# Patient Record
Sex: MALE | Race: OTHER | NOT HISPANIC OR LATINO | URBAN - METROPOLITAN AREA
[De-identification: names, ages, dates, MRNs, and addresses within clinical notes are randomized per-mention and may not be internally consistent; named-entity substitution may affect disease eponyms.]

---

## 2022-01-20 ENCOUNTER — TELEPHONE ENCOUNTER (OUTPATIENT)
Dept: URBAN - METROPOLITAN AREA CLINIC 92 | Facility: CLINIC | Age: 22
End: 2022-01-20

## 2022-01-20 ENCOUNTER — LAB OUTSIDE AN ENCOUNTER (OUTPATIENT)
Dept: URBAN - METROPOLITAN AREA CLINIC 92 | Facility: CLINIC | Age: 22
End: 2022-01-20

## 2022-01-21 ENCOUNTER — LAB OUTSIDE AN ENCOUNTER (OUTPATIENT)
Dept: URBAN - METROPOLITAN AREA CLINIC 86 | Facility: CLINIC | Age: 22
End: 2022-01-21

## 2022-01-21 ENCOUNTER — OFFICE VISIT (OUTPATIENT)
Dept: URBAN - METROPOLITAN AREA CLINIC 86 | Facility: CLINIC | Age: 22
End: 2022-01-21
Payer: COMMERCIAL

## 2022-01-21 DIAGNOSIS — R63.4 WEIGHT LOSS: ICD-10-CM

## 2022-01-21 DIAGNOSIS — R11.0 NAUSEA: ICD-10-CM

## 2022-01-21 DIAGNOSIS — R17 JAUNDICE: ICD-10-CM

## 2022-01-21 DIAGNOSIS — R74.8 ABNORMAL LIVER ENZYMES: ICD-10-CM

## 2022-01-21 PROBLEM — 126660000 DEVIATED NASAL SEPTUM: Status: ACTIVE | Noted: 2022-01-21

## 2022-01-21 PROCEDURE — 99245 OFF/OP CONSLTJ NEW/EST HI 55: CPT

## 2022-01-21 PROCEDURE — 99205 OFFICE O/P NEW HI 60 MIN: CPT

## 2022-01-21 RX ORDER — HYDROXYZINE HYDROCHLORIDE 10 MG/1
1 TABLET AS NEEDED FOR PRURITIS TABLET, FILM COATED ORAL
Qty: 42 | Refills: 0 | OUTPATIENT

## 2022-01-21 RX ORDER — ONDANSETRON HYDROCHLORIDE 4 MG/1
1 TABLET OF THE 4MG ODT TABLET, FILM COATED ORAL
Qty: 20 | Refills: 0 | OUTPATIENT

## 2022-01-21 RX ORDER — METHYLPHENIDATE HYDROCHLORIDE 20 MG/1
1 TABLET ON AN EMPTY STOMACH TABLET ORAL
Status: ACTIVE | COMMUNITY

## 2022-01-21 NOTE — EXAM-PHYSICAL EXAM
Gen: awake and responsive. Eyes: min icteric, normal lids. Mouth: covered with mask. Nose: covered with mask. Hearing: intact grossly. Neck: trachea midline and no jvd. CV: RRR no s3. Lungs: clear. No wheezes, Abd: Soft, nabs, NR NT. No clear hsm. Ext: no sig edema, no palm erythema. Neuro: moves all 4 ext grossly. No asterixis. Skin: no pruritis and no palm erythema.

## 2022-01-21 NOTE — HPI-TODAY'S VISIT:
22 yo Male and he is senior at [employer] and he is here for acute hepatitis.  A copy of the note will be sent to referring provider Dr Renate Torrez MD.  Pt says that for the last month he traveled to [place] and to [facility] and to [place] for ski trip.  He went there on winter break.  last trip from [place] he had a headache.  The next day he had mild fever and was just over 100F. Moth says at one point hit 103F.  Then she did a virtual visit with team.  He says that next 2 days post the 103F went down and subsided and replaced and with nausea and itching.  Pt with some discomfort and fullness and appetite. Dark urine.  Hep a and B and covid 19 vaccinated.  Mom was told could be covid 19 and then told was neg. Friday of last week did labs and mono spot neg and then had to redo the labs.   Jan 19 2022 did labs and saw other doctor and felt was covid 19 and ordered the labs.  Has lost 5 pounds with the last 10 days.  He has missed school.  Jan 13 covid 19 neg,  Jan 19 2022 na 134 slightly low and k 4.3 and cl 96 little low and co2 28 and bun 14 and cr 1.01 glu 70 tp 7.0 and alb 4.4 and tb 3.1 and ca 9.4 and ast 326 and alt 786 and alk 406. wbc 15.2 hg 15.9 and plat 253.  wbc 15.2 hg 15.9 and hct 49.3 plat 253.  neutrophils 1.82 and lymphs 10.18 and elevated and reactive lymphs 0.61. Monocyte 1.98 and basophils 0.46. RPR neg and mono screen neg and HIv neg.   Jan 18 2022 wbc 14.1 hg 15.2 and plat 227 and mcv 87.7 and mono spot neg.  They ordered other std screens.   Plan: 1. Needs to maintain hydration and nutrition. 2. Supplements like boost and ensure help to bridge as lost 5 pounds. If worse may need to be admitted. 3. Need to do more labs to for trends and do the full screens. 4. He has the u.s pending for today. 5. Pt will be doing labs also then next week. 6. Fractionate the bili. 7. See next week.  Stressed to pt the need for social distancing and strict handwashing and wearing a mask and to follow any other new or added CDC recommendations as this is an evolving target.  Duration of the visit was 77 minutes with 10 minutes of chart prep and review of [facility] records and pt info and then 67 minutes for this face to face visit today with the pt and his mother for the visit today reviewing recent records and his course and current status and future plans with the pt and his family.

## 2022-01-22 ENCOUNTER — TELEPHONE ENCOUNTER (OUTPATIENT)
Dept: URBAN - METROPOLITAN AREA CLINIC 92 | Facility: CLINIC | Age: 22
End: 2022-01-22

## 2022-01-22 NOTE — HPI-TODAY'S VISIT:
Dear Chito Max, I was waiting for the other labs to come through but they have not come through yet.  I suspect that they will come through on Monday. The direct bilirubin was 1.33 which was elevated that we do not have the indirect. White blood cell count was elevated at 20.9 and hemoglobin was 16 MCV 84.8 platelet count 294.  Neutrophils were normal at 2.53 and lymphocytes again up at 16.18.  Monocytes up at 2.03. INR normal at 0.96 which is a good sign that the liver is still functioning well. I await the other tests and the speciality tests. U.s not back yet. Spoke with pt re the limited results that were back. Says nausea better but fatigue there. Says itching is about the same. Dr. Herrera

## 2022-01-24 ENCOUNTER — LAB OUTSIDE AN ENCOUNTER (OUTPATIENT)
Dept: URBAN - METROPOLITAN AREA CLINIC 86 | Facility: CLINIC | Age: 22
End: 2022-01-24

## 2022-01-24 ENCOUNTER — TELEPHONE ENCOUNTER (OUTPATIENT)
Dept: URBAN - METROPOLITAN AREA CLINIC 92 | Facility: CLINIC | Age: 22
End: 2022-01-24

## 2022-01-24 NOTE — HPI-TODAY'S VISIT:
Dear Chito Max, More labs back today from Friday. MARCEL antigen which is an immune screen was negative. Hep A IgM is negative but you are immune to hepatitis a as demonstrated by the IgG antibody. Your hepatitis B antibody is 9.1 and I suspect that you need to get a booster at some point as they consider anything less than 11.5 is being not immune.  We could potentially repeat it after this illness and see if it goes up as another alternate but suspect if it did not go up with the immune reaction it may not go up unless you get a HBV booster dose exposure.  We can then check for an amnestic response. Hep B core total negative and hep B core IgM negative. CMV PCR negative.  Herpes PCR negative. Awaiting the [facility] panel which is very important panel for you.  Other tests are still pending as well. Spoke with pt re the labs and he is aware of that. Dr Herrera

## 2022-01-25 ENCOUNTER — OFFICE VISIT (OUTPATIENT)
Dept: URBAN - METROPOLITAN AREA CLINIC 86 | Facility: CLINIC | Age: 22
End: 2022-01-25
Payer: COMMERCIAL

## 2022-01-25 DIAGNOSIS — U07.1 COVID-19: ICD-10-CM

## 2022-01-25 DIAGNOSIS — R11.0 NAUSEA: ICD-10-CM

## 2022-01-25 DIAGNOSIS — R50.9 FEVER: ICD-10-CM

## 2022-01-25 DIAGNOSIS — F90.9 ADHD: ICD-10-CM

## 2022-01-25 DIAGNOSIS — R74.8 ABNORMAL LIVER ENZYMES: ICD-10-CM

## 2022-01-25 DIAGNOSIS — R53.83 FATIGUE: ICD-10-CM

## 2022-01-25 DIAGNOSIS — R17 JAUNDICE: ICD-10-CM

## 2022-01-25 DIAGNOSIS — Z71.89 VACCINE COUNSELING: ICD-10-CM

## 2022-01-25 DIAGNOSIS — B17.8: ICD-10-CM

## 2022-01-25 DIAGNOSIS — R63.4 WEIGHT LOSS: ICD-10-CM

## 2022-01-25 DIAGNOSIS — Z87.09 HISTORY OF SINUSITIS: ICD-10-CM

## 2022-01-25 PROCEDURE — 99214 OFFICE O/P EST MOD 30 MIN: CPT

## 2022-01-25 RX ORDER — HYDROXYZINE HYDROCHLORIDE 10 MG/1
1 TABLET AS NEEDED FOR PRURITIS TABLET, FILM COATED ORAL
Qty: 42 | Refills: 0 | Status: ACTIVE | COMMUNITY

## 2022-01-25 RX ORDER — METHYLPHENIDATE HYDROCHLORIDE 20 MG/1
1 TABLET ON AN EMPTY STOMACH TABLET ORAL
Status: ACTIVE | COMMUNITY

## 2022-01-25 RX ORDER — ONDANSETRON HYDROCHLORIDE 4 MG/1
1 TABLET OF THE 4MG ODT TABLET, FILM COATED ORAL
Qty: 20 | Refills: 0 | OUTPATIENT

## 2022-01-25 RX ORDER — ONDANSETRON HYDROCHLORIDE 4 MG/1
1 TABLET OF THE 4MG ODT TABLET, FILM COATED ORAL
Qty: 20 | Refills: 0 | Status: ACTIVE | COMMUNITY

## 2022-01-25 NOTE — HPI-TODAY'S VISIT:
Pt is a  22 yo Male and here reena follow up post recent visit and he is here for acute hepatitis.  A copy of the note will be sent to referring provider Dr Renate Torrez MD.  Clinically he is feeling better.  Labs slowly tricklin/24: na 138 and k 4.3 and cl 98 and co2 30 and  glu 94 and cr 0.95 and bun 15 and tp 7.1 and alb 4.1 and tb 1.5 and ca 9.0 ast 169 and alt 581 and alk 439 and wbc 15 and hg 15.5 and plat 288.  inr 0.92    factin neg 13 and ama 9.7 neg.   : The direct bilirubin was 1.33 which was elevated that we do not have the indirect. White blood cell count was elevated at 20.9 and hemoglobin was 16 MCV 84.8 platelet count 294.  Neutrophils were normal at 2.53 and lymphocytes again up at 16.18.  Monocytes up at 2.03. INR normal at 0.96 which is a good sign that the liver is still functioning well.  U.s pending to get the report.  CT; done with contrast and he did that : unable to get this now. Slight liver and spleen enlargement and a few LN seen and nothing acute.  Prior tb 3 range so looks he is trending down.  Pt says that for the last month he traveled to [place] and to [facility] and to [place] for ski trip.  He went there on winter break.  last trip from [place] he had a headache.  The next day he had mild fever and was just over 100F. Moth says at one point hit 103F.  Then she did a virtual visit with team.  He says that next 2 days post the 103F went down and subsided and replaced and with nausea and itching.  Pt with some discomfort and fullness and appetite. Dark urine.  Hep a and B and covid 19 vaccinated.  Mom was told could be covid 19 and then told was neg. Friday of last week did labs and mono spot neg and then had to redo the labs.   2022 did labs and saw other doctor and felt was covid 19 and ordered the labs.  Has lost 5 pounds with the last 10 days.   covid 19 neg,  2022 na 134 slightly low and k 4.3 and cl 96 little low and co2 28 and bun 14 and cr 1.01 glu 70 tp 7.0 and alb 4.4 and tb 3.1 and ca 9.4 and ast 326 and alt 786 and alk 406. wbc 15.2 hg 15.9 and plat 253.  wbc 15.2 hg 15.9 and hct 49.3 plat 253.  neutrophils 1.82 and lymphs 10.18 and elevated and reactive lymphs 0.61. Monocyte 1.98 and basophils 0.46. RPR neg and mono screen neg and HIV neg.   2022 wbc 14.1 hg 15.2 and plat 227 and mcv 87.7 and mono spot neg.  They ordered other std screens.   Plan: 1. Needs to maintain hydration and nutrition as doing kavita post the ct. 2. Get the ct when the portal is up. 3. We will do the labs in 1 and 2 weeks and see then. Telemed vs in person. 4. Pt is headed back to school in few days. 5. Little residual mild sore throat and that is classic for mono and little itching. 6. Rtc in 2 weeks.  Stressed to pt the need for social distancing and strict handwashing and wearing a mask and to follow any other new or added CDC recommendations as this is an evolving target.  Duration of the visit was 30  minutes with 5 minutes of chart prep and 30 min of this face to face vsiit for todays visit with the pt and his mother and I discussing the labs and trends at length.

## 2022-01-25 NOTE — EXAM-PHYSICAL EXAM
Gen: awake and responsive. Some fatigue. Eyes: not visibily icteric, normal lids. Mouth: covered with mask. Nose: covered with mask. Hearing: intact grossly. Neck: trachea midline and no jvd. CV: RRR no s3. Lungs: clear. No wheezes, Abd: Soft, nabs, NR NT. No clear hepatomegaly and spleen trace palpable. Ext: no sig edema, no palm erythema. Neuro: moves all 4 ext grossly. No asterixis. Skin: less pruritis and no palm erythema.

## 2022-01-26 ENCOUNTER — TELEPHONE ENCOUNTER (OUTPATIENT)
Dept: URBAN - METROPOLITAN AREA CLINIC 92 | Facility: CLINIC | Age: 22
End: 2022-01-26

## 2022-01-26 NOTE — HPI-TODAY'S VISIT:
Dear Chito Max, Finally today I received the January 21 ultrasound but it turns out it was actually done at [facility] and not through an [facility] location.  I knew that they were trying to get you done at several locations and thought you were done at the [facility] location but instead it was an [facility] location.  Liver appeared normal to them. Pancreas was normal were seen. Gallbladder showed no stones or inflammation. No bile duct dilation seen. No hydronephrosis seen. Spleen also was borderline enlarged here at 13 cm. Liver vessel flow was patent as expected. I tried to call but could not get and vmail for you was not active. Dr Herrera

## 2022-01-27 ENCOUNTER — TELEPHONE ENCOUNTER (OUTPATIENT)
Dept: URBAN - METROPOLITAN AREA CLINIC 92 | Facility: CLINIC | Age: 22
End: 2022-01-27

## 2022-01-27 NOTE — HPI-TODAY'S VISIT:
Dear Chito Max, Jan 21 more labs: alpha one normal 200 level and M1M1 normal phenotype. This was another screen that we did. Good to see is negative. Dr Herrera

## 2022-01-28 ENCOUNTER — LAB OUTSIDE AN ENCOUNTER (OUTPATIENT)
Dept: URBAN - METROPOLITAN AREA CLINIC 86 | Facility: CLINIC | Age: 22
End: 2022-01-28

## 2022-01-28 ENCOUNTER — TELEPHONE ENCOUNTER (OUTPATIENT)
Dept: URBAN - METROPOLITAN AREA CLINIC 86 | Facility: CLINIC | Age: 22
End: 2022-01-28

## 2022-01-29 ENCOUNTER — TELEPHONE ENCOUNTER (OUTPATIENT)
Dept: URBAN - METROPOLITAN AREA CLINIC 92 | Facility: CLINIC | Age: 22
End: 2022-01-29

## 2022-01-29 LAB
ALBUMIN: 4.4
ALKALINE PHOSPHATASE: 344
ALT (SGPT): 271
AST (SGOT): 71
BILIRUBIN, DIRECT: 0.38
BILIRUBIN, TOTAL: 0.8
PROTEIN, TOTAL: 7.2

## 2022-01-29 NOTE — HPI-TODAY'S VISIT:
Dear Chito Max, Jan 28 alb 4.4 and tb 0.8 and db 0.38 so normal bilirubin seen now. Alk 344 and ast 71 and alt 271 still elevated but lower with ideal alt less than 35. Jan 24: tb 1.4 and ast 169 and alt 581 and alk 439 so labs are dropping nicely. Lets redo the labs in a week again if can.  If not  if busy with school, could do in 2 weeks as all suggests that you are on downward swing now on the labs. Tried to call but went to [employer] and left you message. Dr Herrera

## 2022-02-04 ENCOUNTER — LAB OUTSIDE AN ENCOUNTER (OUTPATIENT)
Dept: URBAN - METROPOLITAN AREA CLINIC 86 | Facility: CLINIC | Age: 22
End: 2022-02-04

## 2022-02-08 ENCOUNTER — LAB OUTSIDE AN ENCOUNTER (OUTPATIENT)
Dept: URBAN - METROPOLITAN AREA TELEHEALTH 2 | Facility: TELEHEALTH | Age: 22
End: 2022-02-08

## 2022-02-08 ENCOUNTER — OFFICE VISIT (OUTPATIENT)
Dept: URBAN - METROPOLITAN AREA TELEHEALTH 2 | Facility: TELEHEALTH | Age: 22
End: 2022-02-08
Payer: COMMERCIAL

## 2022-02-08 DIAGNOSIS — R74.8 ABNORMAL LIVER ENZYMES: ICD-10-CM

## 2022-02-08 DIAGNOSIS — B27.99 EPSTEIN BARR VIRUS POSITIVE MONONUCLEOSIS SYNDROME: ICD-10-CM

## 2022-02-08 DIAGNOSIS — Z79.899 HIGH RISK MEDICATION USE: ICD-10-CM

## 2022-02-08 DIAGNOSIS — R11.0 NAUSEA: ICD-10-CM

## 2022-02-08 PROCEDURE — 99214 OFFICE O/P EST MOD 30 MIN: CPT

## 2022-02-08 RX ORDER — ONDANSETRON HYDROCHLORIDE 4 MG/1
1 TABLET OF THE 4MG ODT TABLET, FILM COATED ORAL
Qty: 20 | Refills: 0 | Status: ON HOLD | COMMUNITY

## 2022-02-08 RX ORDER — ONDANSETRON HYDROCHLORIDE 4 MG/1
1 TABLET OF THE 4MG ODT TABLET, FILM COATED ORAL
Qty: 20 | Refills: 0 | OUTPATIENT

## 2022-02-08 RX ORDER — HYDROXYZINE HYDROCHLORIDE 10 MG/1
1 TABLET AS NEEDED FOR PRURITIS TABLET, FILM COATED ORAL
Qty: 42 | Refills: 0 | Status: ON HOLD | COMMUNITY

## 2022-02-08 RX ORDER — METHYLPHENIDATE HYDROCHLORIDE 20 MG/1
1 TABLET ON AN EMPTY STOMACH TABLET ORAL
Status: ACTIVE | COMMUNITY

## 2022-02-08 NOTE — HPI-TODAY'S VISIT:
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  A copy of the note will be sent to referring provider Dr Renate Torrez MD.  Pt forgot to redo the labs.  Pt is back on campus and trying to catch up.  Tests continue to pour that he did and las one was  Jan 21 labs back HCV pcr undetectable.  Jan 28 alb 4.4 and tb 0.8 and db 0.38 so normal bilirubin seen now. Alk 344 and ast 71 and alt 271 still elevated but lower with ideal alt less than 35.  Jan 24: tb 1.4 and ast 169 and alt 581 and alk 439 so labs are dropping nicely.  Jan 21 labs: alpha one normal 200 level and M1M1 normal phenotype.  January 21 ultrasound but it turns out it was actually done at [facility] and not through an [facility] location.  I knew that they were trying to get you done at several locations and thought you were done at the [facility] location but instead it was an [facility] location.  Liver appeared normal to them. Pancreas was normal were seen. Gallbladder showed no stones or inflammation. No bile duct dilation seen. No hydronephrosis seen. Spleen also was borderline enlarged here at 13 cm. Liver vessel flow was patent as expected.  One last lab did was ebv pos [id number] and rerun 6200 and so confirmed.    1/24: na 138 and k 4.3 and cl 98 and co2 30 and glu 94 and cr 0.95 and bun 15 and tp 7.1 and alb 4.1 and tb 1.5 and ca 9.0 ast 169 and alt 581 and alk 439 and wbc 15 and hg 15.5 and plat 288. inr 0.92 Jan 21 factin neg 13 and ama 9.7 neg.  Jan 21: The direct bilirubin was 1.33 which was elevated that we do not have the indirect. White blood cell count was elevated at 20.9 and hemoglobin was 16 MCV 84.8 platelet count 294. Neutrophils were normal at 2.53 and lymphocytes again up at 16.18. Monocytes up at 2.03. INR normal at 0.96 which is a good sign that the liver is still functioning well.  CT done by primary with contrast and he did that Jan 24: unable to get this now. Slight liver and spleen enlargement and a few LN seen and nothing acute. Prior tb 3 range so looks he is trending down.  RF: Pt says that for the last month he traveled to [place] and to [facility] and to [place] for ski trip. He went there on winter break. Last trip from [place] he had a headache. Roommate he was skiing that may have had that. Had mild fever and was just over 100F. Mother says at one point hit 103F. He says that next 2 days post the 103F went down and subsided and replaced and with nausea and itching. Pt with some discomfort and fullness and appetite. Dark urine.  Hep A and B and covid 19 vaccinated.  Mom was told could be covid 19 and then told was neg.   Jan 19 2022 did labs and saw other doctor and felt was covid 19 and ordered the labs.  Has lost 5 pounds with the last 10 days.  He has started to gain back some weight and eating better 3-4 pounds.  Jan 13 covid 19 neg,  Jan 19 2022 na 134 slightly low and k 4.3 and cl 96 little low and co2 28 and bun 14 and cr 1.01 glu 70 tp 7.0 and alb 4.4 and tb 3.1 and ca 9.4 and ast 326 and alt 786 and alk 406. wbc 15.2 hg 15.9 and plat 253. wbc 15.2 hg 15.9 and hct 49.3 plat 253. neutrophils 1.82 and lymphs 10.18 and elevated and reactive lymphs 0.61. Monocyte 1.98 and basophils 0.46. RPR neg and mono screen neg and HIV neg.  Jan 18 2022 wbc 14.1 hg 15.2 and plat 227 and mcv 87.7 and mono spot neg. They ordered other std screens.  Plan: 1. Electively he can do labs again for us when can. 2. He feeling better, 3. RTC in 1m. 4. I want to check the ebv pcr for then ahead of the visit. 5. Spleen enlargement a concern. Redo the u,s in 6m to see how does.  Stressed to pt the need for social distancing and strict handwashing and wearing a mask and to follow any other new or added CDCrecommendations as this is an evolving target.  Duration of the visit was 31 minutes with 5 minutes of chart prep and 26 min by alisia for this visit today with the pt and in discussing the labs and course with the pt.

## 2022-02-11 ENCOUNTER — TELEPHONE ENCOUNTER (OUTPATIENT)
Dept: URBAN - METROPOLITAN AREA CLINIC 92 | Facility: CLINIC | Age: 22
End: 2022-02-11

## 2022-02-11 LAB
ALBUMIN: 4.8
ALKALINE PHOSPHATASE: 135
ALT (SGPT): 44
AST (SGOT): 36
BILIRUBIN, DIRECT: 0.23
BILIRUBIN, TOTAL: 0.7
PROTEIN, TOTAL: 7.1

## 2022-02-11 NOTE — HPI-TODAY'S VISIT:
Dear Chito Max, February 10 labs show albumin 4.8 up from 4.4. Bilirubin down to 0.7 from 0.8.   Alkaline phosphatase down to 135 almost normal from 344.  Normal range is from 44 up to 121 so almost there. AST down to 36 from 71 and technically normal.  ALT down to 44 from 271 which is also technically normal but ideal for a man is less than 35. Given these labs doing so much better and knowing how busy you are at school, I would just repeat the labs in about a month or so again.  If you get very busy (and you may) you can even do them right before the next visit as essentially they are very near or in the lab normal range now. Dr Herrera

## 2022-03-04 ENCOUNTER — LAB OUTSIDE AN ENCOUNTER (OUTPATIENT)
Dept: URBAN - METROPOLITAN AREA TELEHEALTH 2 | Facility: TELEHEALTH | Age: 22
End: 2022-03-04

## 2022-03-18 ENCOUNTER — OFFICE VISIT (OUTPATIENT)
Dept: URBAN - METROPOLITAN AREA TELEHEALTH 2 | Facility: TELEHEALTH | Age: 22
End: 2022-03-18

## 2022-03-18 ENCOUNTER — TELEPHONE ENCOUNTER (OUTPATIENT)
Dept: URBAN - METROPOLITAN AREA CLINIC 92 | Facility: CLINIC | Age: 22
End: 2022-03-18

## 2022-03-18 RX ORDER — ONDANSETRON HYDROCHLORIDE 4 MG/1
1 TABLET OF THE 4MG ODT TABLET, FILM COATED ORAL
Qty: 20 | Refills: 0 | Status: ACTIVE | COMMUNITY

## 2022-03-18 RX ORDER — ONDANSETRON HYDROCHLORIDE 4 MG/1
1 TABLET OF THE 4MG ODT TABLET, FILM COATED ORAL
Qty: 20 | Refills: 0 | OUTPATIENT

## 2022-03-18 RX ORDER — HYDROXYZINE HYDROCHLORIDE 10 MG/1
1 TABLET AS NEEDED FOR PRURITIS TABLET, FILM COATED ORAL
Qty: 42 | Refills: 0 | Status: ON HOLD | COMMUNITY

## 2022-03-18 RX ORDER — METHYLPHENIDATE HYDROCHLORIDE 20 MG/1
1 TABLET ON AN EMPTY STOMACH TABLET ORAL
Status: ACTIVE | COMMUNITY

## 2022-03-18 NOTE — HPI-TODAY'S VISIT:
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  A copy of the note will be sent to referring provider Dr Renate Torrez MD.  Pt forgot to redo the labs.  Pt is back on campus and trying to catch up.  Dear Chito Max, February 10 labs show albumin 4.8 up from 4.4. Bilirubin down to 0.7 from 0.8.   Alkaline phosphatase down to 135 almost normal from 344.  Normal range is from 44 up to 121 so almost there. AST down to 36 from 71 and technically normal.  ALT down to 44 from 271 which is also technically normal but ideal for a man is less than 35. Given these labs doing so much better and knowing how busy you are at school, I would just repeat the labs in about a month or so again.  If you get very busy (and you may) you can even do them right before the next visit as essentially they are very near or in the lab normal range now. Dr Herrera   Tests continue to pour that he did and las one was  Jan 21 labs back HCV pcr undetectable.  Jan 28 alb 4.4 and tb 0.8 and db 0.38 so normal bilirubin seen now. Alk 344 and ast 71 and alt 271 still elevated but lower with ideal alt less than 35.  Jan 24: tb 1.4 and ast 169 and alt 581 and alk 439 so labs are dropping nicely.  Jan 21 labs: alpha one normal 200 level and M1M1 normal phenotype.  January 21 ultrasound but it turns out it was actually done at [facility] and not through an [facility] location.  I knew that they were trying to get you done at several locations and thought you were done at the [facility] location but instead it was an [facility] location.  Liver appeared normal to them. Pancreas was normal were seen. Gallbladder showed no stones or inflammation. No bile duct dilation seen. No hydronephrosis seen. Spleen also was borderline enlarged here at 13 cm. Liver vessel flow was patent as expected.  One last lab did was ebv pos [id number] and rerun 6200 and so confirmed.    1/24: na 138 and k 4.3 and cl 98 and co2 30 and glu 94 and cr 0.95 and bun 15 and tp 7.1 and alb 4.1 and tb 1.5 and ca 9.0 ast 169 and alt 581 and alk 439 and wbc 15 and hg 15.5 and plat 288. inr 0.92 Jan 21 factin neg 13 and ama 9.7 neg.  Jan 21: The direct bilirubin was 1.33 which was elevated that we do not have the indirect. White blood cell count was elevated at 20.9 and hemoglobin was 16 MCV 84.8 platelet count 294. Neutrophils were normal at 2.53 and lymphocytes again up at 16.18. Monocytes up at 2.03. INR normal at 0.96 which is a good sign that the liver is still functioning well.  CT done by primary with contrast and he did that Jan 24: unable to get this now. Slight liver and spleen enlargement and a few LN seen and nothing acute. Prior tb 3 range so looks he is trending down.  RF: Pt says that for the last month he traveled to [place] and to [place] and to [place] for ski trip. He went there on winter break. Last trip from [place] he had a headache. Roommate he was skiing that may have had that. Had mild fever and was just over 100F. Mother says at one point hit 103F. He says that next 2 days post the 103F went down and subsided and replaced and with nausea and itching. Pt with some discomfort and fullness and appetite. Dark urine.  Hep A and B and covid 19 vaccinated.  Mom was told could be covid 19 and then told was neg.   Jan 19 2022 did labs and saw other doctor and felt was covid 19 and ordered the labs.  Has lost 5 pounds with the last 10 days.  He has started to gain back some weight and eating better 3-4 pounds.  Jan 13 covid 19 neg,  Jan 19 2022 na 134 slightly low and k 4.3 and cl 96 little low and co2 28 and bun 14 and cr 1.01 glu 70 tp 7.0 and alb 4.4 and tb 3.1 and ca 9.4 and ast 326 and alt 786 and alk 406. wbc 15.2 hg 15.9 and plat 253. wbc 15.2 hg 15.9 and hct 49.3 plat 253. neutrophils 1.82 and lymphs 10.18 and elevated and reactive lymphs 0.61. Monocyte 1.98 and basophils 0.46. RPR neg and mono screen neg and HIV neg.  Jan 18 2022 wbc 14.1 hg 15.2 and plat 227 and mcv 87.7 and mono spot neg. They ordered other std screens.  Plan: 1. Electively he can do labs again for us when can. 2. He feeling better, 3. RTC in 1m. 4. I want to check the ebv pcr for then ahead of the visit. 5. Spleen enlargement a concern. Redo the u,s in 6m to see how does.  Stressed to pt the need for social distancing and strict handwashing and wearing a mask and to follow any other new or added CDCrecommendations as this is an evolving target.  Duration of the visit was minutes with 5 minutes of chart prep and 26 min by alisia for this visit today with the pt and in discussing the labs and course with the pt.

## 2022-03-19 ENCOUNTER — TELEPHONE ENCOUNTER (OUTPATIENT)
Dept: URBAN - METROPOLITAN AREA CLINIC 92 | Facility: CLINIC | Age: 22
End: 2022-03-19

## 2022-03-19 LAB
ALBUMIN: 5.1
ALKALINE PHOSPHATASE: 89
ALT (SGPT): 23
AST (SGOT): 17
BASO (ABSOLUTE): 0
BASOS: 1
BILIRUBIN, DIRECT: 0.11
BILIRUBIN, TOTAL: 0.4
BUN/CREATININE RATIO: 13
BUN: 14
CARBON DIOXIDE, TOTAL: 24
CHLORIDE: 100
CREATININE: 1.08
EBV PCR QUANT(WHOLE BLOOD): 3483
EGFR: 100
EOS (ABSOLUTE): 0.1
EOS: 1
GLUCOSE: 87
HEMATOCRIT: 49
HEMATOLOGY COMMENTS:: (no result)
HEMOGLOBIN: 16.3
IMMATURE CELLS: (no result)
IMMATURE GRANS (ABS): 0
IMMATURE GRANULOCYTES: 0
LOG10 EBV DNA QN PCR: 3.54
LYMPHS (ABSOLUTE): 2.1
LYMPHS: 33
MCH: 27.5
MCHC: 33.3
MCV: 83
MONOCYTES(ABSOLUTE): 0.5
MONOCYTES: 8
NEUTROPHILS (ABSOLUTE): 3.7
NEUTROPHILS: 57
NRBC: (no result)
PLATELETS: 314
POTASSIUM: 4.7
PROTEIN, TOTAL: 7.6
RBC: 5.93
RDW: 13
SODIUM: 140
WBC: 6.5

## 2022-03-19 NOTE — HPI-TODAY'S VISIT:
Dear Chito Max, March 16 labs shopw ebv 3483. Prior 6200 and prior [id number] so see this dropping. Curiously labs normal for liver now and yet still with some virus. This will off and on develop some viremia and this is how people with not symptoms can sadly infect others. Would redo this again likely in 3-6m pending timing of next u.s to see how spleen is doing and line the labs up with that. Glu 87 and bun 14 and cr 1.08 and na 140 and k 4.7 and cl 100 and co2 24.  Tb 0.4 and down from 0.7. Alk 89 normal and down from 135. Ast 17 and down from 36. Alt 23 and down from 44. So liver labs all normal range now. Wbc 6.5 and rbc little up 5.93 and hg 16.3 and platelets 314. Neutrophils 3.7 and lymphs 2.1 normal. Please call Terra at ext [phone] to set up the telemed again as saw you missed yesterday. She can get you a sooner appt as she has access to some hidden spots. Dr Herrera

## 2022-06-14 ENCOUNTER — DASHBOARD ENCOUNTERS (OUTPATIENT)
Age: 22
End: 2022-06-14

## 2022-06-14 ENCOUNTER — OFFICE VISIT (OUTPATIENT)
Dept: URBAN - METROPOLITAN AREA TELEHEALTH 2 | Facility: TELEHEALTH | Age: 22
End: 2022-06-14
Payer: COMMERCIAL

## 2022-06-14 VITALS — WEIGHT: 176 LBS | HEIGHT: 70 IN | BODY MASS INDEX: 25.2 KG/M2

## 2022-06-14 DIAGNOSIS — B27.99 EPSTEIN BARR VIRUS POSITIVE MONONUCLEOSIS SYNDROME: ICD-10-CM

## 2022-06-14 DIAGNOSIS — R17 JAUNDICE: ICD-10-CM

## 2022-06-14 DIAGNOSIS — R63.4 WEIGHT LOSS: ICD-10-CM

## 2022-06-14 DIAGNOSIS — Z79.899 HIGH RISK MEDICATION USE: ICD-10-CM

## 2022-06-14 DIAGNOSIS — Z87.09 HISTORY OF SINUSITIS: ICD-10-CM

## 2022-06-14 DIAGNOSIS — R53.83 FATIGUE: ICD-10-CM

## 2022-06-14 DIAGNOSIS — B17.9 ACUTE HEPATITIS: ICD-10-CM

## 2022-06-14 DIAGNOSIS — Z71.89 VACCINE COUNSELING: ICD-10-CM

## 2022-06-14 DIAGNOSIS — R16.1: ICD-10-CM

## 2022-06-14 DIAGNOSIS — R74.8 ABNORMAL LIVER ENZYMES: ICD-10-CM

## 2022-06-14 DIAGNOSIS — U07.1 COVID-19: ICD-10-CM

## 2022-06-14 DIAGNOSIS — R50.9 FEVER: ICD-10-CM

## 2022-06-14 PROBLEM — 386661006 FEVER: Status: ACTIVE | Noted: 2022-01-21

## 2022-06-14 PROBLEM — 422587007 NAUSEA: Status: ACTIVE | Noted: 2022-01-21

## 2022-06-14 PROBLEM — 161523006 HISTORY OF RESPIRATORY DISEASE: Status: ACTIVE | Noted: 2022-01-21

## 2022-06-14 PROBLEM — 84229001 FATIGUE: Status: ACTIVE | Noted: 2022-01-21

## 2022-06-14 PROBLEM — 816160009 WEIGHT LOSS: Status: ACTIVE | Noted: 2022-01-21

## 2022-06-14 PROBLEM — 16294009 SPLENOMEGALY: Status: ACTIVE | Noted: 2022-01-25

## 2022-06-14 PROBLEM — 409063005 COUNSELING: Status: ACTIVE | Noted: 2022-01-20

## 2022-06-14 PROBLEM — 166643006 LIVER ENZYMES ABNORMAL: Status: ACTIVE | Noted: 2022-01-20

## 2022-06-14 PROBLEM — 37871000 ACUTE HEPATITIS: Status: ACTIVE | Noted: 2022-01-21

## 2022-06-14 PROBLEM — 18165001 JAUNDICE: Status: ACTIVE | Noted: 2022-01-20

## 2022-06-14 PROBLEM — 161646004 H/O: HIGH RISK MEDICATION: Status: ACTIVE | Noted: 2022-01-21

## 2022-06-14 PROBLEM — 840539006 COVID-19: Status: ACTIVE | Noted: 2022-01-21

## 2022-06-14 PROCEDURE — 99214 OFFICE O/P EST MOD 30 MIN: CPT

## 2022-06-14 RX ORDER — HYDROXYZINE HYDROCHLORIDE 10 MG/1
1 TABLET AS NEEDED FOR PRURITIS TABLET, FILM COATED ORAL
Qty: 42 | Refills: 0 | Status: DISCONTINUED | COMMUNITY

## 2022-06-14 RX ORDER — ONDANSETRON HYDROCHLORIDE 4 MG/1
1 TABLET OF THE 4MG ODT TABLET, FILM COATED ORAL
Qty: 20 | Refills: 0 | Status: DISCONTINUED | COMMUNITY

## 2022-06-14 RX ORDER — METHYLPHENIDATE HYDROCHLORIDE 20 MG/1
1 TABLET ON AN EMPTY STOMACH TABLET ORAL
Status: ACTIVE | COMMUNITY

## 2022-06-14 NOTE — HPI-TODAY'S VISIT:
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  A copy of the note will be sent to referring provider Dr Renate Torrez MD.  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  He has not done labs since march.  He is back up here in Aug.  He can wait until back and do that then. He is in long term airbnb.  March 16 labs show ebv 3483. Prior 6200 and prior 31870 so see this dropping.  Labs normal for liver now and yet still with some virus. This is how spreads and can still have some viremia and not know.  Glu 87 and bun 14 and cr 1.08 and na 140 and k 4.7 and cl 100 and co2 24.  Tb 0.4 and down from 0.7. Alk 89 normal and down from 135. Ast 17 and down from 36. Alt 23 and down from 44. So liver labs all normal range now. Wbc 6.5 and rbc little up 5.93 and hg 16.3 and platelets 314. Neutrophils 3.7 and lymphs 2.1 normal.  February 10 labs show albumin 4.8 up from 4.4. Bilirubin down to 0.7 from 0.8.   Alkaline phosphatase down to 135 almost normal from 344.  Normal range is from 44 up to 121 so almost there. AST down to 36 from 71 and technically normal.  ALT down to 44 from 271 which is also technically normal but ideal for a man is less than 35.   Tests continue to pour that he did and las one was  Jan 21 labs back HCV pcr undetectable.  Jan 28 alb 4.4 and tb 0.8 and db 0.38 so normal bilirubin seen now. Alk 344 and ast 71 and alt 271 still elevated but lower with ideal alt less than 35.  Jan 24: tb 1.4 and ast 169 and alt 581 and alk 439 so labs are dropping nicely.  Jan 21 labs: alpha one normal 200 level and M1M1 normal phenotype.  January 21 ultrasound but it turns out it was actually done at [facility] and not through an [facility] location.  I knew that they were trying to get you done at several locations and thought you were done at the [place] gastro location but instead it was an [facility] location.  Liver appeared normal to them. Pancreas was normal were seen. Gallbladder showed no stones or inflammation. No bile duct dilation seen. No hydronephrosis seen. Spleen also was borderline enlarged here at 13 cm. Liver vessel flow was patent as expected.  One last lab did was ebv pos [id number] and rerun 6200 and so confirmed.    1/24: na 138 and k 4.3 and cl 98 and co2 30 and glu 94 and cr 0.95 and bun 15 and tp 7.1 and alb 4.1 and tb 1.5 and ca 9.0 ast 169 and alt 581 and alk 439 and wbc 15 and hg 15.5 and plat 288. inr 0.92 Jan 21 factin neg 13 and ama 9.7 neg.  Jan 21: The direct bilirubin was 1.33 which was elevated that we do not have the indirect. White blood cell count was elevated at 20.9 and hemoglobin was 16 MCV 84.8 platelet count 294. Neutrophils were normal at 2.53 and lymphocytes again up at 16.18. Monocytes up at 2.03. INR normal at 0.96 which is a good sign that the liver is still functioning well.  CT done by primary with contrast and he did that Jan 24: unable to get this now. Slight liver and spleen enlargement and a few LN seen and nothing acute. Prior tb 3 range so looks he is trending down.  RF: Pt says that for the last month he traveled to [place] and to [facility] and to [place] for ski trip. He went there on winter break. Last trip from [place] he had a headache. Roommate he was skiing that may have had that. Had mild fever and was just over 100F. Mother says at one point hit 103F. He says that next 2 days post the 103F went down and subsided and replaced and with nausea and itching. Pt with some discomfort and fullness and appetite. Dark urine.  Hep A and B and covid 19 vaccinated.  Mom was told could be covid 19 and then told was neg.   Jan 19 2022 did labs and saw other doctor and felt was covid 19 and ordered the labs.  Has lost 5 pounds in the prior last 10 days.  He has started to gain back some weight and eating better 3-4 pounds.  Jan 13 covid 19 neg,  Jan 19 2022 na 134 slightly low and k 4.3 and cl 96 little low and co2 28 and bun 14 and cr 1.01 glu 70 tp 7.0 and alb 4.4 and tb 3.1 and ca 9.4 and ast 326 and alt 786 and alk 406. wbc 15.2 hg 15.9 and plat 253. wbc 15.2 hg 15.9 and hct 49.3 plat 253. neutrophils 1.82 and lymphs 10.18 and elevated and reactive lymphs 0.61. Monocyte 1.98 and basophils 0.46. RPR neg and mono screen neg and HIV neg.  Jan 18 2022 wbc 14.1 hg 15.2 and plat 227 and mcv 87.7 and mono spot neg. They ordered other std screens.  He feels normal and he will do labs when back in town.  Plan: 1. Do labs when back. 2. RTC in 6m. 3. He should be ok. 4. Explained ebv may flux up and down on the dna. 5. 2/3 of Americans are pos for that in an old study.  Stressed to pt the need for social distancing and strict handwashing and wearing a mask and to follow any other new or added CDCrecommendations as this is an evolving target.  Duration of the visit was 30 minutes with 10 minutes of chart prep and 20 min by alisia for this visit today with the pt and in discussing the labs and course with the pt.

## 2022-08-12 ENCOUNTER — LAB OUTSIDE AN ENCOUNTER (OUTPATIENT)
Dept: URBAN - METROPOLITAN AREA TELEHEALTH 2 | Facility: TELEHEALTH | Age: 22
End: 2022-08-12

## 2022-11-27 PROBLEM — 406506008 ATTENTION DEFICIT HYPERACTIVITY DISORDER: Status: ACTIVE | Noted: 2022-01-21

## 2022-12-12 ENCOUNTER — LAB OUTSIDE AN ENCOUNTER (OUTPATIENT)
Dept: URBAN - METROPOLITAN AREA TELEHEALTH 2 | Facility: TELEHEALTH | Age: 22
End: 2022-12-12

## 2022-12-13 ENCOUNTER — OFFICE VISIT (OUTPATIENT)
Dept: URBAN - METROPOLITAN AREA TELEHEALTH 2 | Facility: TELEHEALTH | Age: 22
End: 2022-12-13

## 2022-12-13 NOTE — HPI-TODAY'S VISIT:
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  A copy of the note will be sent to referring provider Dr Renate Torrez MD.  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  He has not done labs since march.  He is back up here in Aug.  He can wait until back and do that then. He is in long term airbnb.  March 16 labs show ebv 3483. Prior 6200 and prior 87349 so see this dropping.  Labs normal for liver now and yet still with some virus. This is how spreads and can still have some viremia and not know.  Glu 87 and bun 14 and cr 1.08 and na 140 and k 4.7 and cl 100 and co2 24.  Tb 0.4 and down from 0.7. Alk 89 normal and down from 135. Ast 17 and down from 36. Alt 23 and down from 44. So liver labs all normal range now. Wbc 6.5 and rbc little up 5.93 and hg 16.3 and platelets 314. Neutrophils 3.7 and lymphs 2.1 normal.  February 10 labs show albumin 4.8 up from 4.4. Bilirubin down to 0.7 from 0.8.   Alkaline phosphatase down to 135 almost normal from 344.  Normal range is from 44 up to 121 so almost there. AST down to 36 from 71 and technically normal.  ALT down to 44 from 271 which is also technically normal but ideal for a man is less than 35.   Tests continue to pour that he did and las one was  Jan 21 labs back HCV pcr undetectable.  Jan 28 alb 4.4 and tb 0.8 and db 0.38 so normal bilirubin seen now. Alk 344 and ast 71 and alt 271 still elevated but lower with ideal alt less than 35.  Jan 24: tb 1.4 and ast 169 and alt 581 and alk 439 so labs are dropping nicely.  Jan 21 labs: alpha one normal 200 level and M1M1 normal phenotype.  January 21 ultrasound but it turns out it was actually done at [facility] and not through an [facility] location.  I knew that they were trying to get you done at several locations and thought you were done at the [place] gastro location but instead it was an [facility] location.  Liver appeared normal to them. Pancreas was normal were seen. Gallbladder showed no stones or inflammation. No bile duct dilation seen. No hydronephrosis seen. Spleen also was borderline enlarged here at 13 cm. Liver vessel flow was patent as expected.  One last lab did was ebv pos [id number] and rerun 6200 and so confirmed.    1/24: na 138 and k 4.3 and cl 98 and co2 30 and glu 94 and cr 0.95 and bun 15 and tp 7.1 and alb 4.1 and tb 1.5 and ca 9.0 ast 169 and alt 581 and alk 439 and wbc 15 and hg 15.5 and plat 288. inr 0.92 Jan 21 factin neg 13 and ama 9.7 neg.  Jan 21: The direct bilirubin was 1.33 which was elevated that we do not have the indirect. White blood cell count was elevated at 20.9 and hemoglobin was 16 MCV 84.8 platelet count 294. Neutrophils were normal at 2.53 and lymphocytes again up at 16.18. Monocytes up at 2.03. INR normal at 0.96 which is a good sign that the liver is still functioning well.  CT done by primary with contrast and he did that Jan 24: unable to get this now. Slight liver and spleen enlargement and a few LN seen and nothing acute. Prior tb 3 range so looks he is trending down.  RF: Pt says that for the last month he traveled to [place] and to [place] and to [place] for ski trip. He went there on winter break. Last trip from [place] he had a headache. Roommate he was skiing that may have had that. Had mild fever and was just over 100F. Mother says at one point hit 103F. He says that next 2 days post the 103F went down and subsided and replaced and with nausea and itching. Pt with some discomfort and fullness and appetite. Dark urine.  Hep A and B and covid 19 vaccinated.  Mom was told could be covid 19 and then told was neg.   Jan 19 2022 did labs and saw other doctor and felt was covid 19 and ordered the labs.  Has lost 5 pounds in the prior last 10 days.  He has started to gain back some weight and eating better 3-4 pounds.  Jan 13 covid 19 neg,  Jan 19 2022 na 134 slightly low and k 4.3 and cl 96 little low and co2 28 and bun 14 and cr 1.01 glu 70 tp 7.0 and alb 4.4 and tb 3.1 and ca 9.4 and ast 326 and alt 786 and alk 406. wbc 15.2 hg 15.9 and plat 253. wbc 15.2 hg 15.9 and hct 49.3 plat 253. neutrophils 1.82 and lymphs 10.18 and elevated and reactive lymphs 0.61. Monocyte 1.98 and basophils 0.46. RPR neg and mono screen neg and HIV neg.  Jan 18 2022 wbc 14.1 hg 15.2 and plat 227 and mcv 87.7 and mono spot neg. They ordered other std screens.  He feels normal and he will do labs when back in town.  Plan: 1. Do labs when back. 2. RTC in 6m. 3. He should be ok. 4. Explained ebv may flux up and down on the dna. 5. 2/3 of Americans are pos for that in an old study.  Stressed to pt the need for social distancing and strict handwashing and wearing a mask and to follow any other new or added CDCrecommendations as this is an evolving target.  Duration of the visit was 0 minutes with 10 minutes of chart prep and 20 min by alisia for this visit today with the pt and in discussing the labs and course with the pt.